# Patient Record
Sex: MALE | Race: WHITE | NOT HISPANIC OR LATINO | Employment: UNEMPLOYED | URBAN - NONMETROPOLITAN AREA
[De-identification: names, ages, dates, MRNs, and addresses within clinical notes are randomized per-mention and may not be internally consistent; named-entity substitution may affect disease eponyms.]

---

## 2020-12-17 ENCOUNTER — OFFICE VISIT (OUTPATIENT)
Dept: NEUROSURGERY | Facility: CLINIC | Age: 55
End: 2020-12-17

## 2020-12-17 VITALS
BODY MASS INDEX: 32.73 KG/M2 | HEIGHT: 69 IN | DIASTOLIC BLOOD PRESSURE: 88 MMHG | TEMPERATURE: 96.5 F | SYSTOLIC BLOOD PRESSURE: 132 MMHG | WEIGHT: 221 LBS

## 2020-12-17 DIAGNOSIS — M50.30 DEGENERATIVE DISC DISEASE, CERVICAL: Primary | ICD-10-CM

## 2020-12-17 PROCEDURE — 99203 OFFICE O/P NEW LOW 30 MIN: CPT | Performed by: NEUROLOGICAL SURGERY

## 2020-12-17 RX ORDER — NABUMETONE 750 MG/1
TABLET, FILM COATED ORAL
COMMUNITY
Start: 2020-11-24

## 2020-12-17 RX ORDER — SAXAGLIPTIN 5 MG/1
TABLET, FILM COATED ORAL
COMMUNITY
Start: 2020-12-11

## 2020-12-17 RX ORDER — FOLIC ACID 1 MG/1
TABLET ORAL
COMMUNITY
Start: 2020-11-04

## 2020-12-17 RX ORDER — CANAGLIFLOZIN 100 MG/1
TABLET, FILM COATED ORAL
COMMUNITY
Start: 2020-12-11

## 2020-12-17 RX ORDER — LISINOPRIL 20 MG/1
TABLET ORAL
COMMUNITY
Start: 2020-12-11

## 2020-12-17 RX ORDER — ASPIRIN 81 MG/1
TABLET, COATED ORAL
COMMUNITY
Start: 2020-12-11

## 2020-12-17 RX ORDER — PRAVASTATIN SODIUM 40 MG
TABLET ORAL
COMMUNITY
Start: 2020-12-03

## 2020-12-17 RX ORDER — PANTOPRAZOLE SODIUM 40 MG/1
TABLET, DELAYED RELEASE ORAL
COMMUNITY
Start: 2020-12-11

## 2020-12-17 RX ORDER — HYDROXYCHLOROQUINE SULFATE 200 MG/1
TABLET, FILM COATED ORAL
COMMUNITY
Start: 2020-11-24

## 2020-12-17 NOTE — PROGRESS NOTES
Marisol Poon  1965  [id number]      Chief Complaint   Patient presents with   • Neck Pain   • Numbness     LUE       HISTORY OF PRESENT ILLNESS: This is a 55-year-old with rheumatoid arthritis presenting with a chief complaint of an episode of numbness from his shoulders distally when he awoke in the morning.  He notes that when he does not sleep in a certain position he has numbness in the distribution of the ulnar nerve on the left.  He has had cervical MRIs and is referred for neurosurgical evaluation.  Other than this episode he has not had symptoms that would suggest a myelopathy although clearly has cervical dysfunction.  Likewise he has low back pain related to degenerative osteoarthritis.    Past Medical History:   Diagnosis Date   • Arthritis    • Bronchitis    • Diabetes mellitus (CMS/HCC)    • Hearing loss    • Hypertension    • Low back pain    • Pneumonia        No past surgical history on file.    Family History   Problem Relation Age of Onset   • Arthritis Mother    • Heart disease Father    • Hypertension Father    • Diabetes Father        Social History     Socioeconomic History   • Marital status:      Spouse name: Not on file   • Number of children: Not on file   • Years of education: Not on file   • Highest education level: Not on file   Tobacco Use   • Smoking status: Current Every Day Smoker     Packs/day: 1.00     Types: Cigarettes   • Smokeless tobacco: Never Used   Substance and Sexual Activity   • Alcohol use: Never     Frequency: Never   • Drug use: Never   • Sexual activity: Defer       No Known Allergies      Current Outpatient Medications:   •  Aspirin Low Dose 81 MG EC tablet, , Disp: , Rfl:   •  folic acid (FOLVITE) 1 MG tablet, , Disp: , Rfl:   •  hydroxychloroquine (PLAQUENIL) 200 MG tablet, , Disp: , Rfl:   •  Invokana 100 MG tablet tablet, , Disp: , Rfl:   •  lisinopril (PRINIVIL,ZESTRIL) 20 MG tablet, , Disp: , Rfl:   •  metFORMIN (GLUCOPHAGE) 1000 MG tablet, ,  Disp: , Rfl:   •  methotrexate 2.5 MG tablet, , Disp: , Rfl:   •  nabumetone (RELAFEN) 750 MG tablet, , Disp: , Rfl:   •  Onglyza 5 MG tablet, , Disp: , Rfl:   •  pantoprazole (PROTONIX) 40 MG EC tablet, , Disp: , Rfl:   •  pravastatin (PRAVACHOL) 40 MG tablet, , Disp: , Rfl:     Review of Systems   Constitutional: Negative for activity change, appetite change, chills, diaphoresis, fatigue, fever and unexpected weight change.   HENT: Positive for hearing loss. Negative for congestion, dental problem, drooling, ear discharge, ear pain, facial swelling, mouth sores, nosebleeds, postnasal drip, rhinorrhea, sinus pressure, sneezing, sore throat, tinnitus, trouble swallowing and voice change.    Eyes: Negative for photophobia, pain, discharge, redness, itching and visual disturbance.   Respiratory: Negative for apnea, cough, choking, chest tightness, shortness of breath, wheezing and stridor.    Cardiovascular: Negative for chest pain, palpitations and leg swelling.   Gastrointestinal: Negative for abdominal distention, abdominal pain, anal bleeding, blood in stool, constipation, diarrhea, nausea, rectal pain and vomiting.   Endocrine: Negative for cold intolerance, heat intolerance, polydipsia, polyphagia and polyuria.   Genitourinary: Negative for decreased urine volume, difficulty urinating, dysuria, enuresis, flank pain, frequency, genital sores, hematuria and urgency.   Musculoskeletal: Positive for neck pain and neck stiffness. Negative for arthralgias, back pain, gait problem, joint swelling and myalgias.   Skin: Negative for color change, pallor, rash and wound.   Allergic/Immunologic: Negative for environmental allergies, food allergies and immunocompromised state.   Neurological: Positive for numbness. Negative for dizziness, tremors, seizures, syncope, facial asymmetry, speech difficulty, weakness, light-headedness and headaches.   Hematological: Negative for adenopathy. Does not bruise/bleed easily.  "  Psychiatric/Behavioral: Negative for agitation, behavioral problems, confusion, decreased concentration, dysphoric mood, hallucinations, self-injury, sleep disturbance and suicidal ideas. The patient is not nervous/anxious and is not hyperactive.    All other systems reviewed and are negative.      Vitals:    12/17/20 1107   Height: 175.3 cm (69\")       Neurological Examination:    Mental status/speech: The patient is alert and oriented.  Speech is clear without aphysia or dysarthria.  No overt cognitive deficits.    Cranial nerve examination:    Olfaction: Smell is intact.  Vision: Vision is intact without visual field abnormalities.  Funduscopic examination is normal.  No pupillary irregularity.  Ocular motor examination: The extraocular muscles are intact.  There is no diplopia.  The pupil is round and reactive to both light and accommodation.  There is no nystagmus.  Facial movement/sensation: There is no facial weakness.  Sensation is intact in the first, second, and third divisions of the trigeminal nerve.  The corneal reflex is intact.  Auditory: Hearing is intact to finger rub bilaterally.  Cranial nerves IX, X, XI, XII: Phonation is normal.  No dysphagia.  Tongue is protruded in the midline without atrophy.  The gag reflex is intact.  Shoulder shrug is normal.    Musculoligamentous ligamentous examination: BMI 32.6; weight 201 pounds.  Limited range of motion of the cervical lumbar spine.  I am unable to find weakness, sensory loss or reflex asymmetry.  No Babinski Leon or clonus.  Gait normal.  Straight leg raising, Lasègue and flip test negative    Medical Decision Making:     Diagnostic Data Set: Cervical MRI shows the presence of multilevel degenerative osteoarthritic changes and moderate spinal stenosis.  I do not see abnormalities within the spinal cord, however      Assessment: Cervical myelopathy          Recommendations: Further studies are warranted.  The episode that he has certainly is of " concern.  He needs a cervical myelogram, EMG and NCV and close myelogram CT scan.  We will get these scheduled in [place] I will see him on the same day.  Thank you for allow me to participate in his care.        I greatly appreciate the opportunity to see and evaluate this individual.  If you have questions or concerns regarding issues that I may have overlooked please call me at any time: [phone].  Gerard Keen M.D.  [facility]  [address] [place], [place] [address] .  [facility]  [id number]